# Patient Record
Sex: FEMALE | Race: WHITE | Employment: FULL TIME | URBAN - METROPOLITAN AREA
[De-identification: names, ages, dates, MRNs, and addresses within clinical notes are randomized per-mention and may not be internally consistent; named-entity substitution may affect disease eponyms.]

---

## 2024-10-02 ENCOUNTER — HOSPITAL ENCOUNTER (OUTPATIENT)
Age: 24
Discharge: HOME | End: 2024-10-02

## 2024-10-02 LAB — RUBV IGG SERPL QL IA: 85.1 IU/ML

## 2024-10-02 PROCEDURE — 86765 RUBEOLA ANTIBODY: CPT

## 2024-10-02 PROCEDURE — 86735 MUMPS ANTIBODY: CPT

## 2024-10-02 PROCEDURE — 86481 TB AG RESPONSE T-CELL SUSP: CPT

## 2024-10-02 PROCEDURE — 86762 RUBELLA ANTIBODY: CPT

## 2024-10-02 PROCEDURE — 86787 VARICELLA-ZOSTER ANTIBODY: CPT

## 2024-10-04 LAB
MEV IGG SER-ACNC: 2.34
MUV IGG SER IA-ACNC: 1.1
VZV IGG SER QL IA: 1.51

## 2024-10-05 LAB — T-SPOT TB TEST: NORMAL

## 2025-01-17 ENCOUNTER — OFFICE VISIT (OUTPATIENT)
Age: 25
End: 2025-01-17
Payer: COMMERCIAL

## 2025-01-17 VITALS
DIASTOLIC BLOOD PRESSURE: 84 MMHG | TEMPERATURE: 98.1 F | OXYGEN SATURATION: 99 % | BODY MASS INDEX: 33.27 KG/M2 | HEART RATE: 87 BPM | HEIGHT: 66 IN | SYSTOLIC BLOOD PRESSURE: 118 MMHG | WEIGHT: 207 LBS

## 2025-01-17 DIAGNOSIS — Z00.00 HEALTH MAINTENANCE EXAMINATION: Primary | ICD-10-CM

## 2025-01-17 PROCEDURE — 99395 PREV VISIT EST AGE 18-39: CPT | Performed by: FAMILY MEDICINE

## 2025-01-17 RX ORDER — IBUPROFEN 800 MG/1
800 TABLET, FILM COATED ORAL EVERY 6 HOURS PRN
COMMUNITY
Start: 2024-11-27

## 2025-01-17 RX ORDER — FERROUS SULFATE 325(65) MG
325 TABLET ORAL 2 TIMES DAILY WITH MEALS
COMMUNITY
Start: 2024-11-27

## 2025-01-17 RX ORDER — DOCUSATE SODIUM 100 MG/1
100 CAPSULE, LIQUID FILLED ORAL
COMMUNITY
Start: 2024-11-27

## 2025-01-17 SDOH — ECONOMIC STABILITY: FOOD INSECURITY: WITHIN THE PAST 12 MONTHS, YOU WORRIED THAT YOUR FOOD WOULD RUN OUT BEFORE YOU GOT MONEY TO BUY MORE.: NEVER TRUE

## 2025-01-17 SDOH — ECONOMIC STABILITY: FOOD INSECURITY: WITHIN THE PAST 12 MONTHS, THE FOOD YOU BOUGHT JUST DIDN'T LAST AND YOU DIDN'T HAVE MONEY TO GET MORE.: NEVER TRUE

## 2025-01-17 ASSESSMENT — PATIENT HEALTH QUESTIONNAIRE - PHQ9
SUM OF ALL RESPONSES TO PHQ QUESTIONS 1-9: 0
SUM OF ALL RESPONSES TO PHQ9 QUESTIONS 1 & 2: 0
2. FEELING DOWN, DEPRESSED OR HOPELESS: NOT AT ALL
SUM OF ALL RESPONSES TO PHQ QUESTIONS 1-9: 0
SUM OF ALL RESPONSES TO PHQ QUESTIONS 1-9: 0
1. LITTLE INTEREST OR PLEASURE IN DOING THINGS: NOT AT ALL
SUM OF ALL RESPONSES TO PHQ QUESTIONS 1-9: 0

## 2025-01-17 ASSESSMENT — ENCOUNTER SYMPTOMS
CHEST TIGHTNESS: 0
SHORTNESS OF BREATH: 0

## 2025-01-17 NOTE — PROGRESS NOTES
Rodrick Felix (:  2000) is a 24 y.o. female, Established patient, here for evaluation of the following chief complaint(s):  Annual Exam (2 episodes of HTN while in labor.  Monitored for a while after, everything fine.)         Assessment & Plan  1. Postpartum checkup.  She had a successful delivery with some complications, including excess fluid and the need for a vacuum assist due to the baby's heart rate dropping during pushes. She experienced hypotension during labor, which resolved without the need for medication. She had a tear during delivery, which has healed well. She reports no current health concerns related to the delivery.    2. Anxiety and headaches.  She is currently on a low dose of Zoloft, which has been effective in managing her symptoms. She experiences severe headaches and anxiety attacks if she misses a dose. She has a neurology appointment next month to reassess her condition postpartum. She reports no increase in headaches or other issues since delivery.    PROCEDURE  The patient had an epidural administered during labor, which initially failed to provide relief. After catheterization and bladder drainage, she experienced complete analgesia.    Results    1. Health maintenance examination  Health maintenance- patient in good physical condition- plan f/u yearly  Anxiety- well controlled- on sertraline  HA- patient following neurology  No follow-ups on file.       Subjective   History of Present Illness  The patient presents for a postpartum checkup.    She gave birth to her child 2 days prior to , following an induction due to polyhydramnios. Despite experiencing contractions and a descent of the fetus, labor did not progress, and she remained 3 cm dilated for approximately 1.5 weeks. The decision to induce labor was made during her final ultrasound appointment. At that time, she weighed 233 pounds, and the following day, post-delivery, her weight had decreased to 213